# Patient Record
Sex: FEMALE | Race: WHITE
[De-identification: names, ages, dates, MRNs, and addresses within clinical notes are randomized per-mention and may not be internally consistent; named-entity substitution may affect disease eponyms.]

---

## 2019-08-03 ENCOUNTER — HOSPITAL ENCOUNTER (EMERGENCY)
Dept: HOSPITAL 4 - SED | Age: 24
Discharge: HOME | End: 2019-08-03
Payer: MEDICAID

## 2019-08-03 VITALS — HEIGHT: 61 IN | WEIGHT: 113 LBS | BODY MASS INDEX: 21.34 KG/M2

## 2019-08-03 VITALS — SYSTOLIC BLOOD PRESSURE: 142 MMHG
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
## 2019-08-03 DIAGNOSIS — R03.0: ICD-10-CM
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
## 2019-08-03 DIAGNOSIS — S62.002A: Primary | ICD-10-CM
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
## 2019-08-03 DIAGNOSIS — Z88.6: ICD-10-CM

## 2019-08-03 NOTE — NUR
Written and verbal consent obtained from patient for blood alcohol, name and 
 verified by patient. Disinfected patient's skin with  that did not contain 
alcohol or other volatile organic compound. Collected the blood from the 
subject named by venipuncture, in the presence of Officer . Used a sterile, dry 
hypodermic needle and dry vacuum blood collection. Two dry vacuum blood 
collection was supplied by the officer named above. Withdrew a specimen of 
blood from  of the subject named above. Inverted both blood tube several times 
to ensure that the preservative and anticoagulant were thoroughly mixed in the 
blood specimen. I initialed both blood tube label for identification. The 
labeled blood tubes was handed directly to the Officer named above. The blood 
tubes stopper remained in place while I had possession of the blood tubes. The 
Officer placed tubes into envelope and sealed it in my presence. Envelope 
initialed by myself and Officer from [facility] badge number [id number] Patient tolerated 
well, bandage applied, and bleeding controlled.

## 2019-08-03 NOTE — NUR
L spica splint applied to L wrist.   2+ pulse noted.  Capillary refill <3 
seconds.  Patient has ability to move non-splinted digits. Has sensation 
present to affected site.  Skin color within normal limits. Applied for pain 
management control.
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
## 2019-08-03 NOTE — NUR
Patient given written and verbal discharge instructions and verbalizes 
understanding.  ER MD Dr. Malone discussed with patient the results and 
treatment provided. Patient in stable condition. ID arm band removed.

Rx of motrin given. Patient educated on pain management and to follow up with 
PMD. Pain Scale 0/10.

Opportunity for questions provided and answered. Medication side effect fact 
sheet provided.
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
## 2019-08-03 NOTE — NUR
-------------------------------------------------------------------------------

           *** Note undone in EDM - 08/03/19 at 0640 by [email or web address] ***            

-------------------------------------------------------------------------------

Patient given written and verbal discharge instructions and verbalizes 
understanding.  ER MD Dr. Malone discussed with patient the results and 
treatment provided. Patient in stable condition. ID arm band removed.

Rx of motrin given. Patient educated on pain management and to follow up with 
PMD. Pain Scale 0/10.

Opportunity for questions provided and answered. Medication side effect fact 
sheet provided.